# Patient Record
Sex: FEMALE | Race: WHITE
[De-identification: names, ages, dates, MRNs, and addresses within clinical notes are randomized per-mention and may not be internally consistent; named-entity substitution may affect disease eponyms.]

---

## 2017-06-28 ENCOUNTER — HOSPITAL ENCOUNTER (OUTPATIENT)
Dept: HOSPITAL 45 - C.PAPS | Age: 72
Discharge: HOME | End: 2017-06-28
Attending: OBSTETRICS & GYNECOLOGY
Payer: COMMERCIAL

## 2017-06-28 DIAGNOSIS — Z01.419: Primary | ICD-10-CM

## 2017-06-28 DIAGNOSIS — Z11.51: ICD-10-CM

## 2018-03-23 ENCOUNTER — HOSPITAL ENCOUNTER (OUTPATIENT)
Dept: HOSPITAL 45 - C.RDSM | Age: 73
Discharge: HOME | End: 2018-03-23
Attending: ORTHOPAEDIC SURGERY
Payer: COMMERCIAL

## 2018-03-23 DIAGNOSIS — M25.511: Primary | ICD-10-CM

## 2018-03-28 ENCOUNTER — HOSPITAL ENCOUNTER (OUTPATIENT)
Dept: HOSPITAL 45 - C.ULTR | Age: 73
Discharge: HOME | End: 2018-03-28
Attending: FAMILY MEDICINE
Payer: COMMERCIAL

## 2018-03-28 DIAGNOSIS — K76.0: ICD-10-CM

## 2018-03-28 DIAGNOSIS — R39.89: Primary | ICD-10-CM

## 2018-03-28 DIAGNOSIS — R30.0: ICD-10-CM

## 2018-03-28 NOTE — DIAGNOSTIC IMAGING REPORT
(RENAL)RETROPERITON COMP



CLINICAL HISTORY: 72 years-old Female presenting with R39.89,R30.0 severe

dysuria now resolved, assess for: Renal stones. 



TECHNIQUE: Real-time grayscale and limited color Doppler ultrasound imaging of

the kidneys and bladder was performed. 



COMPARISON: None.



FINDINGS:



Right kidney: Normal echogenicity of renal parenchyma. Right kidney measures

10.9 cm. No hydronephrosis. 2 simple appearing cysts noted, the largest

measuring 2 cm. 



Left kidney: Normal echogenicity of renal parenchyma. Left kidney measures 10.2

cm. No hydronephrosis. 2 simple appearing cysts noted, the largest measuring 2.4

cm.



Bladder: Normal. Bilateral ureteral jets present.



Other: Hyperechogenicity of hepatic parenchyma suggest hepatic steatosis.



IMPRESSION:  

1.  No obstruction. 



2.  No sonographic evidence of renal calculi. Notably, sensitivity of ultrasound

for renal calculi is highly limited in comparison to noncontrast CT.



3.  Hepatic steatosis.







Electronically signed by:  Kevin Ojeda M.D.

3/28/2018 12:30 PM



Dictated Date/Time:  3/28/2018 12:28 PM

## 2018-03-30 ENCOUNTER — HOSPITAL ENCOUNTER (OUTPATIENT)
Dept: HOSPITAL 45 - C.MRIBC | Age: 73
Discharge: HOME | End: 2018-03-30
Attending: ORTHOPAEDIC SURGERY
Payer: COMMERCIAL

## 2018-03-30 DIAGNOSIS — M19.011: ICD-10-CM

## 2018-03-30 DIAGNOSIS — M75.101: ICD-10-CM

## 2018-03-30 DIAGNOSIS — M25.511: Primary | ICD-10-CM

## 2018-03-30 NOTE — DIAGNOSTIC IMAGING REPORT
MRI OF THE RIGHT SHOULDER WITHOUT CONTRAST



CLINICAL HISTORY: Right shoulder pain and decreased range of motion.    



COMPARISON STUDY:  Right shoulder radiographs March 23, 2018.



TECHNIQUE: Utilizing a 1.5 Catherine magnet and dedicated coil, multiplanar,

multiecho imaging of the right shoulder was performed without intravenous or

intra-articular contrast.



FINDINGS: Alignment of the right shoulder is anatomic anatomic. There is

moderate osteoarthritis of the right acromioclavicular joint and mild

osteoarthritis of the right glenohumeral joint. There is a moderate size right

shoulder joint effusion. A 1.1 cm T2 hypointense intra-articular abnormality

along the lateral aspect of the right humeral neck suggests a joint body. There

is no suspicious marrow replacement. There is no fracture. Note is made of a

large full-thickness tear of supraspinatus with muscular atrophy and 3.2 cm of

tendon retraction. This involves the majority of the fibers of supraspinatus.

There is tendinopathy with high-grade partial-thickness tear of infraspinatus.

There is mild muscular atrophy of infraspinatus. Tendinopathy of subscapularis

is noted. The proximal long head of biceps tendon is intact. There is a tear of

the posterior superior glenoid labrum. A T2 hyperintense focus within the

scapula is likely benign.



IMPRESSION:  



1. Large full-thickness tear of supraspinatus with tendon retraction and

muscular atrophy.



2. High-grade partial-thickness tear of infraspinatus with mild muscular

atrophy. 



3. Moderate-sized right shoulder joint effusion with a probable 1.1 cm joint

body.



4. Tear of the posterior superior glenoid labrum.



5. Moderate osteoarthritis of the right acromioclavicular joint and mild

osteoarthritis of the right glenohumeral joint.









Electronically signed by:  Delfino Aleman M.D.

3/30/2018 8:13 AM



Dictated Date/Time:  3/30/2018 8:05 AM

## 2018-04-09 ENCOUNTER — HOSPITAL ENCOUNTER (OUTPATIENT)
Dept: HOSPITAL 45 - C.RDSM | Age: 73
Discharge: HOME | End: 2018-04-09
Attending: ORTHOPAEDIC SURGERY
Payer: COMMERCIAL

## 2018-04-09 DIAGNOSIS — Z96.651: Primary | ICD-10-CM

## 2018-08-09 ENCOUNTER — HOSPITAL ENCOUNTER (OUTPATIENT)
Dept: HOSPITAL 45 - X.SURG | Age: 73
Discharge: HOME | End: 2018-08-09
Attending: ORTHOPAEDIC SURGERY
Payer: COMMERCIAL

## 2018-08-09 VITALS
WEIGHT: 213.45 LBS | HEIGHT: 67.01 IN | WEIGHT: 213.45 LBS | HEIGHT: 67.01 IN | BODY MASS INDEX: 33.5 KG/M2 | BODY MASS INDEX: 33.5 KG/M2 | BODY MASS INDEX: 33.5 KG/M2

## 2018-08-09 VITALS — SYSTOLIC BLOOD PRESSURE: 115 MMHG | OXYGEN SATURATION: 96 % | DIASTOLIC BLOOD PRESSURE: 58 MMHG | HEART RATE: 66 BPM

## 2018-08-09 VITALS — TEMPERATURE: 97.16 F

## 2018-08-09 DIAGNOSIS — K57.30: ICD-10-CM

## 2018-08-09 DIAGNOSIS — M75.01: ICD-10-CM

## 2018-08-09 DIAGNOSIS — E55.9: ICD-10-CM

## 2018-08-09 DIAGNOSIS — K58.9: ICD-10-CM

## 2018-08-09 DIAGNOSIS — E78.5: ICD-10-CM

## 2018-08-09 DIAGNOSIS — E03.9: ICD-10-CM

## 2018-08-09 DIAGNOSIS — Z96.659: ICD-10-CM

## 2018-08-09 DIAGNOSIS — Z79.82: ICD-10-CM

## 2018-08-09 DIAGNOSIS — E11.9: ICD-10-CM

## 2018-08-09 DIAGNOSIS — E66.9: ICD-10-CM

## 2018-08-09 DIAGNOSIS — M75.101: Primary | ICD-10-CM

## 2018-08-09 RX ADMIN — CEFAZOLIN SCH MLS/MIN: 1 INJECTION, POWDER, FOR SOLUTION INTRAVENOUS at 09:52

## 2018-08-09 RX ADMIN — CEFAZOLIN SCH MLS/MIN: 1 INJECTION, POWDER, FOR SOLUTION INTRAVENOUS at 09:55

## 2018-08-09 NOTE — [facility] POST OPERATIVE BRIEF NOTE
Immediate Operative Summary


Operative Date


Aug 9, 2018.





Pre-Operative Diagnosis





Right Shoulder Rotator Cuff Tear





Post-Operative Diagnosis





same, Adhesive Capsulitis





Procedure(s) Performed





Right Shoulder Arthroscopic Capsular Release, Rotator Cuff Repair,


Subacromial


Decompression, Manipulation under Anesthesia, biceps tenotomy





Surgeon


Dr. MADELAINE Chery





Assistant Surgeon(s)


Radha Pratt PA-C





Estimated Blood Loss


50cc





Findings


Consistent with Post-Op Diagnosis





Specimens





none





Drains


None





Anesthesia Type


General Regional





Complication(s)


none





Disposition


Accompanied Pt To Recover:  no


Disposition:  Recovery Room / PACU

## 2018-08-09 NOTE — ANESTHESIOLOGY PROGRESS NOTE
Anesthesia Post Op Note


Date & Time


Aug 9, 2018 at 14:16





Vital Signs


Pain Intensity:  0





Vital Signs Past 12 Hours








  Date Time  Temp Pulse Resp B/P (MAP) Pulse Ox O2 Delivery O2 Flow Rate FiO2


 


8/9/18 14:06 36.3       


 


8/9/18 14:03  67 9  94   


 


8/9/18 14:03  66 9     


 


8/9/18 14:00    115/66 (84)    


 


8/9/18 13:58  65 27  92   


 


8/9/18 13:58  65 27     


 


8/9/18 13:55    114/64 (84)    


 


8/9/18 13:53  68 18     


 


8/9/18 13:53  68 18  94   


 


8/9/18 13:50    114/56 (84)    


 


8/9/18 13:50      Room Air  


 


8/9/18 13:48  69 22  94   


 


8/9/18 13:48  69 22     


 


8/9/18 13:46    117/63 (83)    


 


8/9/18 13:43  66 12     


 


8/9/18 13:43  66 12  92   


 


8/9/18 13:40    120/59 (70)    


 


8/9/18 13:38  65 15  96   


 


8/9/18 13:38  65 15     


 


8/9/18 13:35    116/68 (77)    


 


8/9/18 13:33  65 30     


 


8/9/18 13:33  66 30  96   


 


8/9/18 13:30    115/57 (82)    


 


8/9/18 13:28  68 33  96   


 


8/9/18 13:28  68 33     


 


8/9/18 13:26    112/58 (80)    


 


8/9/18 13:24    125/59 (65)    


 


8/9/18 13:23  72   95   


 


8/9/18 13:23  72      


 


8/9/18 13:18 36.1 70 24 125/59 96 Diffusion Mask 5 


 


8/9/18 09:50    141/71    


 


8/9/18 09:47  70 23  95   


 


8/9/18 09:47  70      


 


8/9/18 09:46  72 26 130/68 95   


 


8/9/18 09:46  70      


 


8/9/18 09:41  72 25  99   


 


8/9/18 09:41  71      


 


8/9/18 09:40    144/67    


 


8/9/18 09:36  75 26 145/72    


 


8/9/18 09:33    162/95    


 


8/9/18 09:31  71      


 


8/9/18 09:31  71   99   


 


8/9/18 09:26  71      


 


8/9/18 09:26  71   99   


 


8/9/18 09:21  60      


 


8/9/18 09:21  65   99   


 


8/9/18 08:38 36.6 68 18 138/92 (107) 96 Room Air  











Notes


Mental Status:  alert / awake / arousable, participated in evaluation


Pt Amnestic to Procedure:  Yes


Nausea / Vomiting:  adequately controlled


Pain:  adequately controlled


Airway Patency, RR, SpO2:  stable & adequate


BP & HR:  stable & adequate


Hydration State:  stable & adequate


Anesthetic Complications:  no major complications apparent

## 2018-08-09 NOTE — [facility] OPERATIVE REPORT
Operative Report


Operative Date


Aug 9, 2018.





Pre-Operative Diagnosis





Right Shoulder Rotator Cuff Tear





Post-Operative Diagnosis





same, Adhesive Capsulitis





Procedure(s) Performed





Right Shoulder Arthroscopic Capsular Release, Rotator Cuff Repair,


Subacromial


Decompression, Manipulation under Anesthesia, biceps tenotomy





Surgeon


Dr. MADELAINE Chery





Assistant Surgeon(s)


Radha Pratt PA-C





Estimated Blood Loss


50cc





Findings


Rotator cuff tear right shoulder, adhesive capsulitis





Specimens





none





Drains


None





Anesthesia Type


General Regional





Complication(s)


none





Disposition


no


Recovery Room / PACU





Indications


Patient is a 73-year-old female with complaints of right shoulder pain.  She 

has been treated conservatively with physical therapy and corticosteroid 

injections.  She has little to no relief at this time.  She also has stiffness 

and pain in her right shoulder.  X-rays were taken and she was found to have 

downward sloping acromion.  MRI was also obtained which found to have a large 

retracted rotator cuff tear of her right shoulder.  Surgical intervention 

recommended and she agreed to proceed.  Risks and complications of surgery were 

discussed and informed consent was obtained.





Description of Procedure


Patient was taken to the operating room placed under general anesthesia.  She 

is given a peripheral nerve block preoperatively.  She was given 2 g of IV 

Ancef for surgical prophylaxis.  Timeout was performed.  She was prepped and 

draped in routine sterile fashion.  I was present during the entire case, 

please see Dr. Chery's operative report for further detail.  Patient was 

awakened and transferred to recovery room in stable condition.


I attest to the content of the Intraoperative Record and any orders documented 

therein.  Any exceptions are noted below.

## 2018-08-09 NOTE — DISCHARGE INSTRUCTIONS-[facility]
Discharge Instructions


Date of Service


Aug 9, 2018.





Visit


Reason for Visit:  Right Shoulder Rotator Cuff Tear





Discharge


Discharge Diagnosis / Problem:  Right shoulder rotator cuff tear





Discharge Goals


Goal(s):  Decrease discomfort, Improve function, Increase independence





Medications


Stopped Medications Name(s):  


METFORMIN STOPPED. LAST DOSE ON TUESDAY.


Restart Stopped Medication(s):


Resume metformin tomorrow if tolerating food, otherwise Saturday





Activity Recommendations


Activity Limitations:  per Instructions/Follow-up section


Weightbearing Status:  Right non-weightbearing (upper extremity)





Anesthesia


.





Post Anesthesia Instructions:





If you have had General Anesthesia or IV Sedation:





*  Do not drive today.


*  Resume driving when surgeon permits.


*  Do not make important decisions or sign legal documents today.


*  Call surgeon for:





   1.  Temperature elevations greater than 101 degrees F.


   2.  Uncontrollable pain.


   3.  Excessive bleeding.


   4.  Persistent nausea and vomiting.


   5.  Medication intolerance (nausea, vomiting or rash).





*  For nausea and vomiting use only clear liquids such as: tea, soda, bouillon 

until nausea subsides, then gradually increase diet as tolerated.





*  If you have any concerns or questions, call your surgeon's office.  If 

physician is unavailable and it is an emergency, call 911 or go to the nearest 

emergency room.





.





Instructions / Follow-Up


Instructions / Follow-Up





The following are instructions to follow after "Shoulder Surgery" including, 


        Acromioplasty, Rotator Cuff Repair and Instability Surgery





ACTIVITY RECOMMENDATIONS:





*  Minimize activity after surgery.





*  No excessive walking, jogging, sports or laboring.





*  Return to activity is individualized depending on the patient and type of 

surgery.





*  Driving is not permitted until at least your first post operative visit.  

Please ask your doctor when it is safe to resume driving.





*  Expect increased discomfort with increased activity.  Continue to ice the 

shoulder as needed.








SCHOOL/WORK RECOMMENDATIONS:





*  You may return to sedentary work or school when you are feeling more 

comfortable.  This is usually 3-7 days after surgery.  








MEDICATIONS:





*  You will have a prescription for pain medication and an anti-inflammatory 

medication after surgery.





*  Use the pain medication for severe pain and the anti-inflammatory for less 

severe pain.  


   Once the pain medication has run out, try to use the anti-inflammatory 

medication.  


   If this is not effective, contact the office [phone] for assistance.





*  The pain medication may cause nausea, constipation and drowsiness.  You 

should see how they affect you before driving or similar activity.





*  The anti-inflammatory medication may cause stomach upset and bleeding.  If 

this occurs let your  doctor know immediately [phone].  





*  Take a stool softener like Colace or a laxative like Senokot to prevent 

constipation.








DIET:





*  Resume previous diet.








SPECIAL CARE:





ICE:  You have the option of an ice cooler, gel packs or ice bags. 





*   If you have an ice cooler, refer to the instructions for that device.  The 

ice cooler may be used continuously.





*  If you do not have an ice cooler, you will need to use ice bags or gel 

packs.  Do not apply ice directly to the skin. 


   Use a thin dressing or margarita shirt between the skin and ice bag.  Apply ice 

for 20-30 minutes and repeat every 2-4 hours.  


   This is especially important for the first 7-10 days after surgery.  Once 

the pain improves, use ice as needed.  





ELEVATION:





*  You may be more comfortable sleeping in an upright position.  Use the sling 

to elevate your arm.





DRESSING:





*  Your dressing will be changed at your first therapy appointment 

approximately 4-5 days after surgery.  


    Band-aids, tape strips or gauze may be applied.  You may then change your 

dressing daily.





*  Reapply dressing followed by the EBIce cooling pad (if chosen) and then the 

sling.  





*  Always wash your hands prior to touching the incision area.





*  Once the stitches are removed, you may leave the wound open to air or cover 

with gauze.





*  Expect some bloody drainage for the first few days after surgery.





*  Leave the tape strips, if present, in place for 5-7 days.





*  Band-aids and gauze may be changed daily.





*  There may be a gauze pad in your armpit area.  This can be changed daily or 

replaced by a dry washcloth.





SLING/BRACE:





*  You will need to use a sling or brace after surgery.  The length of time the 

sling is used is dependent upon the type of surgery performed.





*  Arthroscopic Acromioplasty requires use of the sling for 2-4 weeks for 

comfort.





*  Labral procedures and Rotator Cuff Repairs require use of the sling for a 

longer period of time. 


   Please check with your doctor prior to discontinuing the sling.





BATHING:





*  You may shower or sponge-bathe immediately after surgery.  The post 

operative shoulder  dressing is mostly water-tight.  


   You may shower right over this dressing, but be reasonably careful not to 

get the gauze or incision wet.





*  Once the dressing has been changed on the fourth or fifth day after surgery, 

you may shower and get the incision wet.





*  Wash with regular soap and water.  





*  Do not bathe (submerge the incision), soak, swim or use a hot tub until the 

incision is completely healed over with normal skin and the doctor has given 

the OK to proceed.





*  There is no need to apply any ointments, powders or salves to your incision.





*  Do not apply alcohol or hydrogen peroxide directly to the incision.





*  Diluted peroxide (50:50 mixture with sterile saline) may be used to clean 

dried blood from around the incision area.





THERAPY:





*  You will begin therapy four or five days after surgery.  





*  Organized therapy with the therapist is important for the first 2-4 months 

after surgery depending on the type of procedure. 


    During that time you will attend therapy 1-3 times per week.  





*  You will also need to do daily exercises for range of motion and strength as 

instructed.





*  Patients who have a Capsular Shift Procedure will need to abide by temporary 

range of motion limitations.





*  Patients having Rotator Cuff Surgery are not allowed to actively lift their 

arms until 4-6 weeks after surgery.





*  Please check with your doctor regarding appropriate motion restrictions.








FOLLOW UP VISIT:





*  If not already scheduled, please call the office at [phone] to 

schedule a follow-up appointment for 10 days after surgery and monthly 

thereafter.





*You have a physical therapy appointment on August 14, 2018 at 9 AM.





*You have a follow-up appointment scheduled with Dr. Chery on August 21, 2018 at 10:45 AM.





Diet Recommendations


Home Diet:  no limitations, resume previous diet





Procedures


Procedures Performed:  


Right Shoulder Arthroscopic Capsular Release, Rotator Cuff Repair,


Subacromial


Decompression, Manipulation under Anesthesia, biceps tenotomy





Pending Studies


Studies pending at discharge:  no





Medical Emergencies








.


Who to Call and When:





Medical Emergencies:  If at any time you feel your situation is an emergency, 

please call 911 immediately.





.





Non-Emergent Contact


Non-Emergency issues call your:  Surgeon


Call Non-Emergent contact if:  temperature is above 101, your pain is not 

controlled, your pain is worsening, your pain is unusual for you, your pain is 

concerning you, wound has increased drainage, wound has increased redness, 

wound has increased pain, you have any medication questions





.


.








"Provider Documentation" section prepared by Renae Pratt.








.





[place] Drug Monitoring Program


Search Results:  patient reviewed within database, no issues identified

## 2018-08-09 NOTE — [facility] OPERATIVE REPORT
Operative Report


Operative Date


Aug 9, 2018.





Pre-Operative Diagnosis





Right Shoulder Rotator Cuff Tear





Post-Operative Diagnosis





same, Adhesive Capsulitis





Procedure(s) Performed





Right Shoulder Arthroscopic Capsular Release, Rotator Cuff Repair,


Subacromial


Decompression, Manipulation under Anesthesia, biceps tenotomy





Surgeon


Dr. MADELAINE Chery





Assistant Surgeon(s)


Radha Pratt PA-C





Estimated Blood Loss


50cc





Findings


Rotator cuff tear, adhesive capsulitis.





Specimens





none





Drains


None





Anesthesia


General with interscalene block





Complication(s)


None





Disposition


Recovery Room / PACU





Indications


Patient is a 73-year-old female.  She is diabetic.  She has a long history of 

right shoulder pain refractory to nonsurgical methods of management and wishes 

to have repair of her torn rotator cuff.





Description of Procedure


Informed consent obtained.  Patient identified.  She identified the operative 

site as the right shoulder.  I marked with my initials.  Preop surgical timeout 

was performed.  Preop dose of IV antibiotics given.  She was anesthetized and 

then positioned beachchair using the tenent body positioner and the [place]ora arm 

cartwright.  Bony prominences were inspected and padded the torso secured the table 

the neck was held in neutral alignment with padding over the mastoid processes 

the knees were flexed and the hip heels were padded.  1% lidocaine with 

epinephrine was injected into the shoulder joint fat pad and portal sites.  DVT 

prophylaxis intraoperatively with foot pumps.  Postoperatively early mobility.





The examination her knee and under anesthesia revealed external rotation of 

about 80 on the left forward elevation of 170.  In mid position she had about 

45 of internal rotation.  On the right shoulder she had external rotation of 

about 30 forward elevation of about 120 and internal rotation of 30 in the 

same position.  I then gently performed a manipulation of yielding the typical 

release of adhesions with a frozen shoulder.  After the manipulation and 

flexion external rotation and internal rotation.  Her internal rotation was 

about 40.  Her external rotation was 60.  Her forward elevation was about 160.

  It was very close to the opposite side.





The shoulder was prepped and draped in usual sterile fashion a posterior soft 

spot viewing portal was established followed by an anterior mid glenoid working 

portal.  Diagnostic arthroscopy was performed.  The hematoma was evacuated.  

The biceps tendon appeared to be normal and I tenotomized it.  The subscap was 

intact as was the infraspinatus and teres minor.  There is a full-thickness 

retracted tear of the supraspinatus.  The rotator interval was debrided back to 

the base of the coracoid and the leading edge of the surface of the 

supraspinatus was identified.  I did a capsular release anterior posterior and 

inferior using basket forceps and the shaver.  There was some mild chondrosis 

of both the glenoid and humeral head and some degenerative labral tearing which 

I debrided.  There were no loose bodies.  Synovitis was noted throughout the 

shoulder and debrided were applicable.





Scope was placed in the subacromial space into accessory lateral and 

anterolateral portals were created.  The greater tuberosity was prepared with a 

curette shaver and bur.  The tear was estimated to be about 25 mm in size.  The 

tear was a L-shaped configuration I was able to identify the appropriate apex 

and move this anterolaterally.  This was perhaps a centimeter lateral to the 

articular margin and about 1/2 cm posterior to the biceps.  This tear was stiff 

and retracted.  And this was after the releases.  On the bursal surface I 

debrided all the adhesions and released back to the base of the coracoid.  I 

debrided the coracohumeral ligament.  I also debrided on the undersurface of 

the rotator cuff supraspinatus bluntly with an elevator to free this up as 

well.  The rotator cuff could not be completely reduced but I had I would say 80

% of the footprint covered.





She had abnormal lateral overhang and I debrided some of this but still cannot 

get the proper angle for insertion of the medial row.  I then went ahead and 

used an advisor portal and this had a slight medial inclination when viewed 

posteriorly.  I then selected this portal and percutaneously inserted to a bio 

composite anchors near the anterior and posterior extents of the tear.  The 

anterior anchor was placed at the level of the reduction of the rotator cuff.





I then went ahead and passed these tapes up sequentially at the desired 

location.  I passed the free suture in the anterior anchor up through the 

rotator cuff tissue was well.  I went ahead and reduce the rotator cuff and 

then tied this suture holding the reduction in place.  I then crisscrossed the 

fiber tapes and did a speed bridge repair with 2 5.5 bio composite swivel lock 

anchors out laterally.  There was good purchase of all the anchors.  The scope 

was placed intra-articularly and there was good coverage of the repair.  I then 

went ahead and did a limited lateral and anterior decompression leaving the 

coracoacromial ligament intact.  I removed about 3-5 mm of anterior and lateral 

bone.  I think that she is not at increased risk for recurrent tear and wanted 

to preserve the coracoacromial arch.





The shaver was run through the shoulder to  loose debris.  The portals 

were closed with 4-0 nylon.  A soft sterile dressing was applied after cleaning 

the arms wet and dry sponges.  A soft sterile dressing was applied consisting 

of an ABD in the armpit Xeroform 4 x 4's and ABDs.  Not a sling with pillow was 

applied.





She was awakened from anesthesia without difficulty and taken to the recovery 

room in stable condition.  There were no specimens or complications.  Counts 

were correct in the case.  Blood loss was approximately 50 cc.  At the 

conclusion the operation spoke patient's family informed of my findings and 

gave detailed postoperative instructions.  She will be rehabilitated according 

to the arthroscopic rotator cuff repair of a medium-sized rotator cuff tear.  

We will get her moving early on to prevent recurrence of her stiffness.


I attest to the content of the Intraoperative Record and any orders documented 

therein.  Any exceptions are noted below.